# Patient Record
Sex: FEMALE | Employment: UNEMPLOYED | URBAN - METROPOLITAN AREA
[De-identification: names, ages, dates, MRNs, and addresses within clinical notes are randomized per-mention and may not be internally consistent; named-entity substitution may affect disease eponyms.]

---
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
## 2020-11-03 ENCOUNTER — HOSPITAL ENCOUNTER (EMERGENCY)
Age: 20
Discharge: HOME | End: 2020-11-03
Attending: EMERGENCY MEDICINE
Payer: COMMERCIAL

## 2020-11-03 VITALS
HEART RATE: 82 BPM | WEIGHT: 108 LBS | RESPIRATION RATE: 19 BRPM | BODY MASS INDEX: 20.39 KG/M2 | SYSTOLIC BLOOD PRESSURE: 96 MMHG | OXYGEN SATURATION: 100 % | TEMPERATURE: 98.3 F | HEIGHT: 61 IN | DIASTOLIC BLOOD PRESSURE: 57 MMHG

## 2020-11-03 DIAGNOSIS — F44.5 PSYCHOGENIC NONEPILEPTIC SEIZURE: Primary | ICD-10-CM

## 2020-11-03 LAB
ALBUMIN SERPL-MCNC: 3.6 G/DL (ref 3.4–5)
ALBUMIN/GLOB SERPL: 1.1 {RATIO} (ref 0.8–1.7)
ALP SERPL-CCNC: 50 U/L (ref 45–117)
ALT SERPL-CCNC: 27 U/L (ref 13–56)
ANION GAP SERPL CALC-SCNC: 7 MMOL/L (ref 3–18)
AST SERPL-CCNC: 19 U/L (ref 10–38)
BASOPHILS # BLD: 0 K/UL (ref 0–0.1)
BASOPHILS NFR BLD: 0 % (ref 0–2)
BILIRUB SERPL-MCNC: 0.2 MG/DL (ref 0.2–1)
BUN SERPL-MCNC: 7 MG/DL (ref 7–18)
BUN/CREAT SERPL: 8 (ref 12–20)
CALCIUM SERPL-MCNC: 8.5 MG/DL (ref 8.5–10.1)
CHLORIDE SERPL-SCNC: 113 MMOL/L (ref 100–111)
CO2 SERPL-SCNC: 23 MMOL/L (ref 21–32)
CREAT SERPL-MCNC: 0.88 MG/DL (ref 0.6–1.3)
DIFFERENTIAL METHOD BLD: ABNORMAL
EOSINOPHIL # BLD: 0.1 K/UL (ref 0–0.4)
EOSINOPHIL NFR BLD: 2 % (ref 0–5)
ERYTHROCYTE [DISTWIDTH] IN BLOOD BY AUTOMATED COUNT: 13.2 % (ref 11.6–14.5)
ETHANOL SERPL-MCNC: <3 MG/DL (ref 0–3)
GLOBULIN SER CALC-MCNC: 3.2 G/DL (ref 2–4)
GLUCOSE SERPL-MCNC: 77 MG/DL (ref 74–99)
HCG SERPL QL: NEGATIVE
HCT VFR BLD AUTO: 35.4 % (ref 35–45)
HGB BLD-MCNC: 11.5 G/DL (ref 12–16)
LYMPHOCYTES # BLD: 2 K/UL (ref 0.9–3.6)
LYMPHOCYTES NFR BLD: 33 % (ref 21–52)
MCH RBC QN AUTO: 27.1 PG (ref 24–34)
MCHC RBC AUTO-ENTMCNC: 32.5 G/DL (ref 31–37)
MCV RBC AUTO: 83.3 FL (ref 74–97)
MONOCYTES # BLD: 0.6 K/UL (ref 0.05–1.2)
MONOCYTES NFR BLD: 10 % (ref 3–10)
NEUTS SEG # BLD: 3.3 K/UL (ref 1.8–8)
NEUTS SEG NFR BLD: 55 % (ref 40–73)
PLATELET # BLD AUTO: 280 K/UL (ref 135–420)
PMV BLD AUTO: 8.5 FL (ref 9.2–11.8)
POTASSIUM SERPL-SCNC: 3.5 MMOL/L (ref 3.5–5.5)
PROT SERPL-MCNC: 6.8 G/DL (ref 6.4–8.2)
RBC # BLD AUTO: 4.25 M/UL (ref 4.2–5.3)
SODIUM SERPL-SCNC: 143 MMOL/L (ref 136–145)
WBC # BLD AUTO: 6 K/UL (ref 4.6–13.2)

## 2020-11-03 PROCEDURE — 84703 CHORIONIC GONADOTROPIN ASSAY: CPT

## 2020-11-03 PROCEDURE — 74011250636 HC RX REV CODE- 250/636: Performed by: EMERGENCY MEDICINE

## 2020-11-03 PROCEDURE — 80307 DRUG TEST PRSMV CHEM ANLYZR: CPT

## 2020-11-03 PROCEDURE — 99285 EMERGENCY DEPT VISIT HI MDM: CPT

## 2020-11-03 PROCEDURE — 94762 N-INVAS EAR/PLS OXIMTRY CONT: CPT

## 2020-11-03 PROCEDURE — 80053 COMPREHEN METABOLIC PANEL: CPT

## 2020-11-03 PROCEDURE — 93005 ELECTROCARDIOGRAM TRACING: CPT

## 2020-11-03 PROCEDURE — 85025 COMPLETE CBC W/AUTO DIFF WBC: CPT

## 2020-11-03 RX ADMIN — SODIUM CHLORIDE 1000 ML: 900 INJECTION, SOLUTION INTRAVENOUS at 23:22

## 2020-11-04 LAB
ATRIAL RATE: 75 BPM
CALCULATED P AXIS, ECG09: 37 DEGREES
CALCULATED R AXIS, ECG10: 82 DEGREES
CALCULATED T AXIS, ECG11: 52 DEGREES
DIAGNOSIS, 93000: NORMAL
P-R INTERVAL, ECG05: 118 MS
Q-T INTERVAL, ECG07: 380 MS
QRS DURATION, ECG06: 86 MS
QTC CALCULATION (BEZET), ECG08: 424 MS
VENTRICULAR RATE, ECG03: 75 BPM

## 2020-11-04 NOTE — ED NOTES
I have reviewed discharge instructions with the patient. The patient verbalized understanding. Patient ambulatory to waiting are with steady gait.

## 2020-11-04 NOTE — ED PROVIDER NOTES
Jet Flynn is a 20 y.o. female with history of nonepileptic seizures who had a recent admission or long-term EEGs which showed no activity with 2 episodes of convulsing at home with no urinary incontinence. No tongue injury. Patient took her rescue clonazepam prior. Patient was not postictal for very long. No other recent illness. Patient states she had increased fatigue earlier but no recent fever, chills, cough, chest pain, abdominal pain, nausea or vomiting or diarrhea. Denies any drug or alcohol use. She is followed by her [facility] neurologist.  She has not followed up since she was recently admitted. Unknown trigger to the event. The history is provided by the patient, medical records and the EMS personnel. Past Medical History:   Diagnosis Date    ADHD (attention deficit hyperactivity disorder)     Seizures ([facility] [address]        History reviewed. No pertinent surgical history. History reviewed. No pertinent family history.     Social History     Socioeconomic History    Marital status: SINGLE     Spouse name: Not on file    Number of children: Not on file    Years of education: Not on file    Highest education level: Not on file   Occupational History    Not on file   Social Needs    Financial resource strain: Not on file    Food insecurity     Worry: Not on file     Inability: Not on file    Transportation needs     Medical: Not on file     Non-medical: Not on file   Tobacco Use    Smoking status: Current Some Day Smoker    Smokeless tobacco: Never Used   Substance and Sexual Activity    Alcohol use: Not Currently    Drug use: Never    Sexual activity: Not on file   Lifestyle    Physical activity     Days per week: Not on file     Minutes per session: Not on file    Stress: Not on file   Relationships    Social connections     Talks on phone: Not on file     Gets together: Not on file     Attends Jainism service: Not on file     Active member of club or organization: Not on file     Attends meetings of clubs or organizations: Not on file     Relationship status: Not on file    Intimate partner violence     Fear of current or ex partner: Not on file     Emotionally abused: Not on file     Physically abused: Not on file     Forced sexual activity: Not on file   Other Topics Concern    Not on file   Social History Narrative    Not on file         ALLERGIES: Penicillins and Grass pollen-bermuda, standard    Review of Systems   Constitutional: Negative for fever. HENT: Negative for sore throat and trouble swallowing. Eyes: Negative for visual disturbance. Respiratory: Negative for cough and shortness of breath. Cardiovascular: Negative for chest pain. Gastrointestinal: Negative for abdominal pain. Genitourinary: Negative for difficulty urinating. Musculoskeletal: Negative for gait problem. Skin: Negative for rash. Allergic/Immunologic: Negative for immunocompromised state. Neurological: Positive for syncope. Psychiatric/Behavioral: Positive for sleep disturbance. Vitals:    11/03/20 2259 11/03/20 2315   BP: (!) 101/56 (!) 97/56   Pulse: 78    Resp: 16 21   Temp: 98.3 °F (36.8 °C)    SpO2: 99% 100%   Weight: 49 kg (108 lb)    Height: 5' 1\" (1.549 m)             Physical Exam  Vitals signs and nursing note reviewed. Constitutional:       General: She is not in acute distress. Appearance: She is well-developed. She is not ill-appearing, toxic-appearing or diaphoretic. HENT:      Head: Normocephalic and atraumatic. Right Ear: External ear normal.      Left Ear: External ear normal.      Nose: Nose normal.      Mouth/Throat:      Pharynx: Uvula midline. Eyes:      General: No scleral icterus. Conjunctiva/sclera: Conjunctivae normal.   Neck:      Musculoskeletal: Neck supple. Cardiovascular:      Rate and Rhythm: Normal rate and regular rhythm. Heart sounds: Normal heart sounds.    Pulmonary:      Effort: Pulmonary effort is normal. Breath sounds: Normal breath sounds. Abdominal:      Palpations: Abdomen is soft. Tenderness: There is no abdominal tenderness. Skin:     General: Skin is warm and dry. Capillary Refill: Capillary refill takes less than 2 seconds. Neurological:      General: No focal deficit present. Mental Status: She is alert and oriented to person, place, and time. Cranial Nerves: No cranial nerve deficit (3-12). Sensory: No sensory deficit. Motor: No weakness. Gait: Gait normal.   Psychiatric:         Behavior: Behavior normal.          MDM       Procedures  Vitals:  Patient Vitals for the past 12 hrs:   Temp Pulse Resp BP SpO2   11/03/20 2315   21 (!) 97/56 100 %   11/03/20 2259 98.3 °F (36.8 °C) 78 16 (!) 101/56 99 %         Medications ordered:   Medications   sodium chloride 0.9 % bolus infusion 1,000 mL (1,000 mL IntraVENous New Bag 11/3/20 9132)         Lab findings:  Recent Results (from the past 12 hour(s))   CBC WITH AUTOMATED DIFF    Collection Time: 11/03/20 11:00 PM   Result Value Ref Range    WBC 6.0 4.6 - 13.2 K/uL    RBC 4.25 4.20 - 5.30 M/uL    HGB 11.5 (L) 12.0 - 16.0 g/dL    HCT 35.4 35.0 - 45.0 %    MCV 83.3 74.0 - 97.0 FL    MCH 27.1 24.0 - 34.0 PG    MCHC 32.5 31.0 - 37.0 g/dL    RDW 13.2 11.6 - 14.5 %    PLATELET 295 705 - 125 K/uL    MPV 8.5 (L) 9.2 - 11.8 FL    NEUTROPHILS 55 40 - 73 %    LYMPHOCYTES 33 21 - 52 %    MONOCYTES 10 3 - 10 %    EOSINOPHILS 2 0 - 5 %    BASOPHILS 0 0 - 2 %    ABS. NEUTROPHILS 3.3 1.8 - 8.0 K/UL    ABS. LYMPHOCYTES 2.0 0.9 - 3.6 K/UL    ABS. MONOCYTES 0.6 0.05 - 1.2 K/UL    ABS. EOSINOPHILS 0.1 0.0 - 0.4 K/UL    ABS.  BASOPHILS 0.0 0.0 - 0.1 K/UL    DF AUTOMATED     METABOLIC PANEL, COMPREHENSIVE    Collection Time: 11/03/20 11:00 PM   Result Value Ref Range    Sodium 143 136 - 145 mmol/L    Potassium 3.5 3.5 - 5.5 mmol/L    Chloride 113 (H) 100 - 111 mmol/L    CO2 23 21 - 32 mmol/L    Anion gap 7 3.0 - 18 mmol/L    Glucose 77 74 - 99 mg/dL    BUN 7 7.0 - 18 MG/DL    Creatinine 0.88 0.6 - 1.3 MG/DL    BUN/Creatinine ratio 8 (L) 12 - 20      GFR est AA >60 >60 ml/min/1.73m2    GFR est non-AA >60 >60 ml/min/1.73m2    Calcium 8.5 8.5 - 10.1 MG/DL    Bilirubin, total 0.2 0.2 - 1.0 MG/DL    ALT (SGPT) 27 13 - 56 U/L    AST (SGOT) 19 10 - 38 U/L    Alk. phosphatase 50 45 - 117 U/L    Protein, total 6.8 6.4 - 8.2 g/dL    Albumin 3.6 3.4 - 5.0 g/dL    Globulin 3.2 2.0 - 4.0 g/dL    A-G Ratio 1.1 0.8 - 1.7     HCG QL SERUM    Collection Time: 11/03/20 11:00 PM   Result Value Ref Range    HCG, Ql. Negative NEG     ETHYL ALCOHOL    Collection Time: 11/03/20 11:00 PM   Result Value Ref Range    ALCOHOL(ETHYL),SERUM <3 0 - 3 MG/DL   EKG, 12 LEAD, INITIAL    Collection Time: 11/03/20 11:19 PM   Result Value Ref Range    Ventricular Rate 75 BPM    Atrial Rate 75 BPM    P-R Interval 118 ms    QRS Duration 86 ms    Q-T Interval 380 ms    QTC Calculation (Bezet) 424 ms    Calculated P Axis 37 degrees    Calculated R Axis 82 degrees    Calculated T Axis 52 degrees    Diagnosis       Normal sinus rhythm  Normal ECG  No previous ECGs available         EKG interpretation by ED Physician:  Normal sinus rhythm with no acute ST or T wave changes  Normal EKG  Rate 75 QRS 86   No previous EKG    Cardiac monitor: Normal rate with regular rhythm and no ectopy  Pulse ox interpretation: 100% room air, normal    X-Ray, CT or other radiology findings or impressions:  No orders to display       Progress notes, Consult notes or additional Procedure notes:   Do not feel patient requires any further work-up here. Recent multiple EEG show no actual seizure activity. Reevaluation of patient:   stable    Disposition:  Diagnosis:   1.  Psychogenic nonepileptic seizure        Disposition: home    Follow-up Information     Follow up With Specialties Details Why Contact Info    Louann Laguna MD Neurology Schedule an appointment as soon as possible for a visit  [address] [place] [address] [address] [phone]  [phone]              Patient's Medications   Start Taking    No medications on file   Continue Taking    LAMOTRIGINE (LAMICTAL) 100 MG TABLET    Take 100 mg by mouth two (2) times a day. METHYLPHENIDATE HCL (CONCERTA PO)    Take  by mouth. ONDANSETRON (ZOFRAN ODT) 4 MG DISINTEGRATING TABLET    Take 1 Tab by mouth every eight (8) hours as needed for Nausea. POLYETHYLENE GLYCOL (MIRALAX) 17 GRAM/DOSE POWDER    Take 17 g by mouth three (3) times daily as needed.  1 tablespoon with 8 oz of water daily   These Medications have changed    No medications on file   Stop Taking    No medications on file

## 2020-11-04 NOTE — ED TRIAGE NOTES
Arrives ems post seizure  Witnessed by friend  Pt states she took her seizure medication prior to the seizure pt unsure how long seizure lasted  Pt arrives sleepy in appearance is awake and oriented  Seizure precautions intiated

## 2020-11-04 NOTE — DISCHARGE INSTRUCTIONS
Patient Education        Learning About Psychogenic Non-Epileptic Seizure  What is psychogenic non-epileptic seizure (PNES)? Psychogenic non-epileptic seizures (PNES) don't have a physical cause. They aren't caused by epilepsy. But people with epilepsy also may have PNES. People who have a lot of stress, mental illness, or emotional trauma may be more likely to have PNES. Even though PNES doesn't have a physical cause, it is a real condition. The seizures can be scary. And not knowing why you're having them can be frustrating. What happens during PNES? PNES may look like epileptic seizures. But epileptic seizures usually follow the same pattern every time. With PNES, each episode may be different. During a PNES episode, you may have jerky movements, tingling skin, or problems with coordination. You may notice changes in your vision or sense of smell. Some people have episodes often. Others have them only once in a while. For some people, episodes stop over time. Other people keep having them. How is PNES diagnosed? Your doctor will do tests to find out if you have epilepsy. An EEG test lets your doctor see the electrical activity of your brain. The test is often used to diagnose epilepsy. It helps your doctor know what types of seizures you are having. Your doctor also may do blood tests. PNES can be mistaken for epilepsy at first. As a result, some people with PNES are treated with epilepsy medicines. But most of the time, these medicines don't help. The right diagnosis allows your doctor to give you treatments that will help with the stress and other issues that may be related to PNES. How is PNES treated? Treatment varies with each person. The goals of treatment are to relieve stress and to help you learn ways to cope with difficult areas of your life. You may get medicines or counseling. A type of counseling called cognitive-behavioral therapy (CBT) may help with the stress and emotional issues.  In CBT, you learn to identify and change thinking styles that may be adding to your stress. CBT is done by licensed mental health providers, such as psychologists, social workers, and therapists. It can be done in one-on-one sessions or in a group setting. Care at home   Lowering your stress may help with PNES. Here are some things you can do. How to relax your mind   · Write. It may help to write about things that are bothering you. This helps you find out how much stress you feel and what is causing it. When you know this, you can find better ways to cope. · Let your feelings out. Talk, laugh, cry, and express anger when you need to. Talking with friends, family, a counselor, or a member of the clergy about your feelings is a healthy way to relieve stress. · Do something you enjoy. For example, listen to music or go to a movie. Practice your hobby or do volunteer work. · Meditate. This can help you relax, because you are not worrying about what happened before or what may happen in the future. · Do guided imagery. Imagine yourself in any setting that helps you feel calm. You can use audiotapes, books, or a teacher to guide you. How to relax your body   · Do something active. Exercise or activity can help reduce stress. Walking is a great way to get started. Even everyday activities such as housecleaning or yard work can help. · Do breathing exercises. For example:  ? From a standing position, bend forward from the waist with your knees slightly bent. Let your arms dangle close to the floor. ? Breathe in slowly and deeply as you return to a standing position. Roll up slowly, and lift your head last.  ? Hold your breath for just a few seconds in the standing position. ? Breathe out slowly and bend forward from the waist.  · Try yoga or aicha chi. These techniques combine exercise and meditation. You may need some training at first to learn them.   Talk to your doctor about whether it is safe to do certain activities, such as drive or swim. Follow-up care is a key part of your treatment and safety. Be sure to make and go to all appointments, and call your doctor if you are having problems. It's also a good idea to know your test results and keep a list of the medicines you take. Where can you learn more? Go to http://www.gray.com/  Enter G570 in the search box to learn more about \"Learning About Psychogenic Non-Epileptic Seizure. \"  Current as of: November 20, 2019               Content Version: 12.6  © 1855-6397 [employer]. Care instructions adapted under license by [employer] (which disclaims liability or warranty for this information). If you have questions about a medical condition or this instruction, always ask your healthcare professional. [address] any warranty or liability for your use of this information.

## 2025-05-02 ENCOUNTER — APPOINTMENT (OUTPATIENT)
Facility: HOSPITAL | Age: 25
End: 2025-05-02
Payer: MEDICAID

## 2025-05-02 ENCOUNTER — HOSPITAL ENCOUNTER (EMERGENCY)
Facility: HOSPITAL | Age: 25
Discharge: HOME | End: 2025-05-02
Attending: STUDENT IN AN ORGANIZED HEALTH CARE EDUCATION/TRAINING PROGRAM
Payer: MEDICAID

## 2025-05-02 VITALS
HEIGHT: 61 IN | HEART RATE: 83 BPM | RESPIRATION RATE: 18 BRPM | BODY MASS INDEX: 20.39 KG/M2 | OXYGEN SATURATION: 100 % | DIASTOLIC BLOOD PRESSURE: 65 MMHG | TEMPERATURE: 98.7 F | WEIGHT: 108 LBS | SYSTOLIC BLOOD PRESSURE: 101 MMHG

## 2025-05-02 DIAGNOSIS — R56.9 SEIZURE (HCC): Primary | ICD-10-CM

## 2025-05-02 LAB
ALBUMIN SERPL-MCNC: 3.3 G/DL (ref 3.4–5)
ALBUMIN/GLOB SERPL: 1.3 (ref 0.8–1.7)
ALP SERPL-CCNC: 39 U/L (ref 45–117)
ALT SERPL-CCNC: 18 U/L (ref 10–35)
ANION GAP SERPL CALC-SCNC: 11 MMOL/L (ref 3–18)
AST SERPL-CCNC: 20 U/L (ref 10–38)
BASOPHILS # BLD: 0.05 K/UL (ref 0–0.1)
BASOPHILS NFR BLD: 0.6 % (ref 0–2)
BILIRUB SERPL-MCNC: 0.3 MG/DL (ref 0.2–1)
BUN SERPL-MCNC: 10 MG/DL (ref 6–23)
BUN/CREAT SERPL: 10 (ref 12–20)
CALCIUM SERPL-MCNC: 8 MG/DL (ref 8.5–10.1)
CHLORIDE SERPL-SCNC: 112 MMOL/L (ref 98–107)
CO2 SERPL-SCNC: 19 MMOL/L (ref 21–32)
CREAT SERPL-MCNC: 1.01 MG/DL (ref 0.6–1.3)
DIFFERENTIAL METHOD BLD: ABNORMAL
EKG ATRIAL RATE: 84 BPM
EKG DIAGNOSIS: NORMAL
EKG P AXIS: 57 DEGREES
EKG P-R INTERVAL: 134 MS
EKG Q-T INTERVAL: 350 MS
EKG QRS DURATION: 82 MS
EKG QTC CALCULATION (BAZETT): 413 MS
EKG R AXIS: 77 DEGREES
EKG T AXIS: 49 DEGREES
EKG VENTRICULAR RATE: 84 BPM
EOSINOPHIL # BLD: 0.02 K/UL (ref 0–0.4)
EOSINOPHIL NFR BLD: 0.2 % (ref 0–5)
ERYTHROCYTE [DISTWIDTH] IN BLOOD BY AUTOMATED COUNT: 12.6 % (ref 11.6–14.5)
ETHANOL SERPL-MCNC: <11 MG/DL (ref 0–0.08)
GLOBULIN SER CALC-MCNC: 2.6 G/DL (ref 2–4)
GLUCOSE SERPL-MCNC: 62 MG/DL (ref 74–108)
HCG SERPL QL: NEGATIVE
HCT VFR BLD AUTO: 42.4 % (ref 35–45)
HGB BLD-MCNC: 14.3 G/DL (ref 12–16)
IMM GRANULOCYTES # BLD AUTO: 0.01 K/UL (ref 0–0.04)
IMM GRANULOCYTES NFR BLD AUTO: 0.1 % (ref 0–0.5)
LACTATE BLD-SCNC: 1.9 MMOL/L (ref 0.4–2)
LYMPHOCYTES # BLD: 2.13 K/UL (ref 0.9–3.3)
LYMPHOCYTES NFR BLD: 25.1 % (ref 21–52)
MAGNESIUM SERPL-MCNC: 1.9 MG/DL (ref 1.6–2.6)
MCH RBC QN AUTO: 29.4 PG (ref 24–34)
MCHC RBC AUTO-ENTMCNC: 33.7 G/DL (ref 31–37)
MCV RBC AUTO: 87.1 FL (ref 78–100)
MONOCYTES # BLD: 0.72 K/UL (ref 0.05–1.2)
MONOCYTES NFR BLD: 8.5 % (ref 3–10)
NEUTS SEG # BLD: 5.57 K/UL (ref 1.8–8)
NEUTS SEG NFR BLD: 65.5 % (ref 40–73)
NRBC # BLD: 0 K/UL (ref 0–0.01)
NRBC BLD-RTO: 0 PER 100 WBC
PLATELET # BLD AUTO: 246 K/UL (ref 135–420)
PMV BLD AUTO: 8.8 FL (ref 9.2–11.8)
POTASSIUM SERPL-SCNC: 3.4 MMOL/L (ref 3.5–5.5)
PROT SERPL-MCNC: 5.8 G/DL (ref 6.4–8.2)
RBC # BLD AUTO: 4.87 M/UL (ref 4.2–5.3)
SODIUM SERPL-SCNC: 142 MMOL/L (ref 136–145)
WBC # BLD AUTO: 8.5 K/UL (ref 4.6–13.2)

## 2025-05-02 PROCEDURE — 6360000002 HC RX W HCPCS: Performed by: STUDENT IN AN ORGANIZED HEALTH CARE EDUCATION/TRAINING PROGRAM

## 2025-05-02 PROCEDURE — 93005 ELECTROCARDIOGRAM TRACING: CPT | Performed by: STUDENT IN AN ORGANIZED HEALTH CARE EDUCATION/TRAINING PROGRAM

## 2025-05-02 PROCEDURE — 6360000004 HC RX CONTRAST MEDICATION: Performed by: STUDENT IN AN ORGANIZED HEALTH CARE EDUCATION/TRAINING PROGRAM

## 2025-05-02 PROCEDURE — 96374 THER/PROPH/DIAG INJ IV PUSH: CPT

## 2025-05-02 PROCEDURE — 85025 COMPLETE CBC W/AUTO DIFF WBC: CPT

## 2025-05-02 PROCEDURE — 74177 CT ABD & PELVIS W/CONTRAST: CPT

## 2025-05-02 PROCEDURE — 83605 ASSAY OF LACTIC ACID: CPT

## 2025-05-02 PROCEDURE — 99285 EMERGENCY DEPT VISIT HI MDM: CPT

## 2025-05-02 PROCEDURE — 84703 CHORIONIC GONADOTROPIN ASSAY: CPT

## 2025-05-02 PROCEDURE — 83735 ASSAY OF MAGNESIUM: CPT

## 2025-05-02 PROCEDURE — 2580000003 HC RX 258: Performed by: STUDENT IN AN ORGANIZED HEALTH CARE EDUCATION/TRAINING PROGRAM

## 2025-05-02 PROCEDURE — 80053 COMPREHEN METABOLIC PANEL: CPT

## 2025-05-02 PROCEDURE — 82077 ASSAY SPEC XCP UR&BREATH IA: CPT

## 2025-05-02 RX ORDER — MORPHINE SULFATE 4 MG/ML
4 INJECTION, SOLUTION INTRAMUSCULAR; INTRAVENOUS
Status: DISCONTINUED | OUTPATIENT
Start: 2025-05-02 | End: 2025-05-02 | Stop reason: HOSPADM

## 2025-05-02 RX ORDER — LEVETIRACETAM 500 MG/5ML
1500 INJECTION, SOLUTION, CONCENTRATE INTRAVENOUS ONCE
Status: COMPLETED | OUTPATIENT
Start: 2025-05-02 | End: 2025-05-02

## 2025-05-02 RX ORDER — SODIUM CHLORIDE, SODIUM LACTATE, POTASSIUM CHLORIDE, AND CALCIUM CHLORIDE .6; .31; .03; .02 G/100ML; G/100ML; G/100ML; G/100ML
1000 INJECTION, SOLUTION INTRAVENOUS ONCE
Status: COMPLETED | OUTPATIENT
Start: 2025-05-02 | End: 2025-05-02

## 2025-05-02 RX ORDER — LEVETIRACETAM 500 MG/1
500 TABLET ORAL 2 TIMES DAILY
Qty: 60 TABLET | Refills: 0 | Status: SHIPPED | OUTPATIENT
Start: 2025-05-02 | End: 2025-06-01

## 2025-05-02 RX ORDER — IOPAMIDOL 612 MG/ML
100 INJECTION, SOLUTION INTRAVASCULAR
Status: COMPLETED | OUTPATIENT
Start: 2025-05-02 | End: 2025-05-02

## 2025-05-02 RX ADMIN — SODIUM CHLORIDE, SODIUM LACTATE, POTASSIUM CHLORIDE, AND CALCIUM CHLORIDE 1000 ML: .6; .31; .03; .02 INJECTION, SOLUTION INTRAVENOUS at 19:36

## 2025-05-02 RX ADMIN — LEVETIRACETAM 1500 MG: 100 INJECTION INTRAVENOUS at 19:42

## 2025-05-02 RX ADMIN — IOPAMIDOL 80 ML: 612 INJECTION, SOLUTION INTRAVENOUS at 21:05

## 2025-05-02 ASSESSMENT — PAIN - FUNCTIONAL ASSESSMENT: PAIN_FUNCTIONAL_ASSESSMENT: 0-10

## 2025-05-02 ASSESSMENT — PAIN SCALES - GENERAL: PAINLEVEL_OUTOF10: 5

## 2025-05-02 NOTE — ED PROVIDER NOTES
EMERGENCY DEPARTMENT HISTORY AND PHYSICAL EXAM    7:20 PM      Date: 5/2/2025  Patient Name: Aggie Marr    History of Presenting Illness     Chief Complaint   Patient presents with    Seizures       History From: Patient    Aggie Marr is a 24 y.o. female   HPI  74-year-old female with history of ADHD, bipolar disorder, seizures who presents with seizures.  Patient said that she is post to be on Lamictal however has not taken it past month.  States that she has not been getting that much sleep.  Was told by EMS that patient had seizure-like activity.  Lasted seconds to minutes.  No aggravating or alleviating factors.  Patient also endorsing some right-sided abdominal pain.  Located in the right upper quadrant radiates down to the right lower quadrant.  Moderate to severe, intermittent, throbbing.  Has been going on for the past 2 days.  No aggravating alleviating factors.  When assessing ROS she has no nausea, vomiting, chest pain, changes in bowel movements, or any other changes.    Nursing Notes were all reviewed and agreed with or any disagreements were addressed in the HPI.    PCP: Benjamin Garcia MD    Current Facility-Administered Medications   Medication Dose Route Frequency Provider Last Rate Last Admin    lactated ringers bolus 1,000 mL  1,000 mL IntraVENous Once Behzad Clement MD        morphine sulfate (PF) injection 4 mg  4 mg IntraVENous NOW Behzad Clement MD        levETIRAcetam (KEPPRA) injection 1,500 mg  1,500 mg IntraVENous Once Behzad Clement MD         Current Outpatient Medications   Medication Sig Dispense Refill    lamoTRIgine (LAMICTAL) 100 MG tablet Take 1 tablet by mouth daily 30 tablet 1    etodolac (LODINE) 400 MG tablet Take 400 mg by mouth daily      ondansetron (ZOFRAN-ODT) 4 MG disintegrating tablet Take 4 mg by mouth every 8 hours as needed      polyethylene glycol (GLYCOLAX) 17 GM/SCOOP powder Take 17 g by mouth 3 times daily as needed         Past History

## 2025-05-02 NOTE — ED TRIAGE NOTES
Patient BIBA for witness seizure. Patient [place] the mall in the parking lot when she had a seizure witness by friends and boyfriend. Patient was lowered to the ground, and didn't hit her head. Patient has history of seizures and hasn't had one in about 1 year per patient. Patient supposed to be taking medication but hasn't in 1 month as she is seeking a new doctor. Patient A&Ox3 at this time.

## 2025-05-03 NOTE — DISCHARGE INSTRUCTIONS
You were evaluated for seizures .  Based on your work-up it was deemed that she was stable for discharge.  Please  your medication of keppra which was prescribed to you.  Please follow-up with your primary care physician if you have any further concerns and go over your work-up.  If you experience any chest pain, shortness of breath, worsening abdominal pain, vomiting blood, worsening headache, seizures, or any worsening of your symptoms please return to the emergency department immediately.  If you have any pending results or any further questions please contact the emergency department at [phone]

## 2025-05-05 LAB
EKG ATRIAL RATE: 84 BPM
EKG DIAGNOSIS: NORMAL
EKG P AXIS: 57 DEGREES
EKG P-R INTERVAL: 134 MS
EKG Q-T INTERVAL: 350 MS
EKG QRS DURATION: 82 MS
EKG QTC CALCULATION (BAZETT): 413 MS
EKG R AXIS: 77 DEGREES
EKG T AXIS: 49 DEGREES
EKG VENTRICULAR RATE: 84 BPM

## 2025-05-05 PROCEDURE — 93010 ELECTROCARDIOGRAM REPORT: CPT | Performed by: INTERNAL MEDICINE
